# Patient Record
(demographics unavailable — no encounter records)

---

## 2025-07-30 NOTE — CONSULT LETTER
[Today's Date] : [unfilled] [Dear  ___:] : Dear Dr. [unfilled]: [Consult - Single Provider] : Thank you very much for allowing me to participate in the care of this patient. If you have any questions, please do not hesitate to contact me. [Sincerely,] : Sincerely, [FreeTextEntry4] : Dr Mcghee [FreeTextEntry5] : [address] [FreeTextEntry6] : [facility] [address] [de-identified] : Thank you for allowing me to participate in the care of this patient.  Please see my note for my assessment and plan and feel free to contact me if there are any questions or concerns. [de-identified] : Torres Duran MD, MS, [facility] Congenital Interventional Cardiologist Director of Pediatric Cardiac Catheterization [facility]  of Pediatric[facility], [facility] [employer] at [facility] [place] [address] Tel: [phone] Fax: [phone]   [email or web address]

## 2025-07-30 NOTE — REASON FOR VISIT
[Follow-Up] : a follow-up visit for [Mother] : mother [Language Line ] : provided by Language Line   [FreeTextEntry3] : PDA [Interpreters_IDNumber] : [id number] [Interpreters_FullName] : vivian [TWNoteComboBox1] : [place]

## 2025-07-30 NOTE — CARDIOLOGY SUMMARY
[Today's Date] : [unfilled] [FreeTextEntry1] : Sinus rhythm at a rate of 115 beats per minute with a normal NC interval.  There is RBBB.  There is no evidence of atrial enlargement, ventricular hypertrophy or pre-excitation.  The QRS axis is normal.  The QTc is prolonged, likely secondary to wide QRS. [FreeTextEntry2] : 1. Limited study due to patient incessant agitation. 2. "A small PDA jet" was seen, But not the actual PDA. 3. Intact atrial septum. 4. The left atrium is normal in size. 5. The right atrium is normal in size. 6. Normal aortic valve morphology and systolic Doppler profile. 7. Normal pulmonary valve morphology and normal pulmonary valve morphology and systolic Doppler profile. 8. Normal main pulmonary artery. 9. Normal right and left branch pulmonary arteries. 10. Qualitatively normal right ventricular systolic function. 11. Normal left ventricular morphology. 12. Qualitatively normal left ventricular systolic function. 13. No pericardial effusion.

## 2025-07-30 NOTE — HISTORY OF PRESENT ILLNESS
[FreeTextEntry1] : I had the pleasure of seeing CLIVE DIAMOND in the pediatric cardiology clinic of [facility] on 2025.  He was accompanied by his mother and the visit was conducted via .  As you know, CLIVE is a 98-lycva-aat male who was initially referred for cardiology consultation due to an echo in the post- period that demonstrated mild MR and a small PDA.  He was last seen by me on 2024.  Since that visit he has been thriving, has been feeding without difficulty, has been gaining weight and developing appropriately.  There has been no tachypnea, increased work of breathing, cyanosis, excessive diaphoresis, unexplained irritability, or syncope.

## 2025-07-30 NOTE — DISCUSSION/SUMMARY
[May participate in all age-appropriate activities] : [unfilled] May participate in all age-appropriate activities. [FreeTextEntry1] : PROBLEM LIST: 1. Small PDA. 2. Mild MR, resolved. 3. RBBB.   In summary, CLIVE is a 20-ihmfo-jmy M who presents for follow-up of a small PDA, mild MR and RBBB.  His history, physical exam, EKG, and echocardiogram are overall reassuring.  Specifically, the MR has resolved, and the PDA does not appear to be hemodynamically significant.  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  I will plan to see him in 1 year with an ecg and echocardiogram to f/u on the PDA and the RBBB - especially since his echocardiogram today was sub-optimal.   In the interim, if there are any further questions, concerns or changes in his clinical status we would be happy to see him at that time.  The mother was instructed to return if CLIVE develops poor feeding, poor growth, cyanosis, respiratory distress, activity intolerance, syncope or any other concerning symptoms.  He does not require SBE prophylaxis or activity limitations.  The family expressed understanding of and agreement with the plan.  All questions were answered.   Thank you for allowing us to participate in the care of this patient.  Feel free to reach out to us with any further questions or concerns. [Needs SBE Prophylaxis] : [unfilled] does not need bacterial endocarditis prophylaxis